# Patient Record
Sex: FEMALE | Race: WHITE | URBAN - METROPOLITAN AREA
[De-identification: names, ages, dates, MRNs, and addresses within clinical notes are randomized per-mention and may not be internally consistent; named-entity substitution may affect disease eponyms.]

---

## 2018-08-10 ENCOUNTER — IMPORTED ENCOUNTER (OUTPATIENT)
Dept: URBAN - METROPOLITAN AREA CLINIC 50 | Facility: CLINIC | Age: 73
End: 2018-08-10

## 2018-08-17 NOTE — PATIENT DISCUSSION
New Prescription: Restasis MultiDose (cyclosporine): drops: 0.05% 1 drop twice a day into both eyes 08-

## 2018-08-17 NOTE — PATIENT DISCUSSION
CATARACT, OU - VISUALLY SIGNIFICANT. SCHEDULE PHACO WITH IOL  IF VISUAL SYMPTOMS PERSIST. GLASSES RX GIVEN TO FILL IF DESIRES IN THE EVENT PATIENT DOES NOT PROCEED WITH SURGERY.

## 2018-08-17 NOTE — PATIENT DISCUSSION
K SICCA OU: PRESCRIBED PRESERVATIVE FREE ARTIFICIAL TEARS 4-6X A DAY, OU AND THE DAILY INTAKE OF OMEGA-3 DHA/EPA FATTY ACIDS TO HELP RELIEVE SYMPTOMS. ADD NIGHTLY LUBRICATING OINTMENT OR GEL. PRESCRIBED RESTASIS BID WITH LOTEMAX BRIDGE. CONSIDER PUNCTAL PLUGS AND/OR LIPIFLOW TREATMENT NEXT VISIT IF NOT RESPONSIVE OR IF SYMPTOMS PERSIST. RETURN FOR FOLLOW-UP AS SCHEDULED OR SOONER IF SYMPTOMS WORSEN.

## 2018-08-21 ENCOUNTER — IMPORTED ENCOUNTER (OUTPATIENT)
Dept: URBAN - METROPOLITAN AREA CLINIC 50 | Facility: CLINIC | Age: 73
End: 2018-08-21

## 2018-11-27 NOTE — PATIENT DISCUSSION
Dry Eye Syndrome Counseling: I have discussed the diagnosis and the pathophysiology of this disease with the patient. Eyelid pathology and systemic illnesses such as Sjogrens disease or rheumatoid arthritis may contribute to severity. Vision may be limited by dry eye, and symptoms exacerbated by environmental factors such as smoke, wind, or prolonged eye use. Lifestyle habits and environmental factors contributing to dry eyes have been reviewed with the patient. Daily prescribed and over the counter medications, along with their potential contributions to dry eye symptoms, have been discussed. Treatment options include, but are not limited to, artificial tears, punctal plugs, topical cyclosporine, oral omega-3 supplements, Lipiflow, moisture goggles, and lubricating ointments. I stressed the importance of compliance with treatment.

## 2018-11-27 NOTE — PATIENT DISCUSSION
K SICCA OU: CONTINUE PRESERVATIVE FREE ARTIFICIAL TEARS 4-6X A DAY, OU AND THE DAILY INTAKE OF OMEGA-3 DHA/EPA FATTY ACIDS TO HELP RELIEVE SYMPTOMS. ADD CELLUVISC OR  NIGHTLY LUBRICATING OINTMENT OR GEL.  CONTINUE RESTASIS BID

## 2019-01-04 ENCOUNTER — IMPORTED ENCOUNTER (OUTPATIENT)
Dept: URBAN - METROPOLITAN AREA CLINIC 50 | Facility: CLINIC | Age: 74
End: 2019-01-04

## 2019-01-11 ENCOUNTER — IMPORTED ENCOUNTER (OUTPATIENT)
Dept: URBAN - METROPOLITAN AREA CLINIC 50 | Facility: CLINIC | Age: 74
End: 2019-01-11

## 2019-02-07 ENCOUNTER — IMPORTED ENCOUNTER (OUTPATIENT)
Dept: URBAN - METROPOLITAN AREA CLINIC 50 | Facility: CLINIC | Age: 74
End: 2019-02-07

## 2019-02-15 ENCOUNTER — IMPORTED ENCOUNTER (OUTPATIENT)
Dept: URBAN - METROPOLITAN AREA CLINIC 50 | Facility: CLINIC | Age: 74
End: 2019-02-15

## 2019-02-15 NOTE — PATIENT DISCUSSION
"""S/P IOL OD: Symfony ZXR00 21.5 vs. 22.0 (ORA) (Target: [place]) +ORA/Femto/Arcs +TM/Omidria. Continue post operative instructions and drops per schedule.  """

## 2019-02-21 ENCOUNTER — IMPORTED ENCOUNTER (OUTPATIENT)
Dept: URBAN - METROPOLITAN AREA CLINIC 50 | Facility: CLINIC | Age: 74
End: 2019-02-21

## 2019-02-21 NOTE — PATIENT DISCUSSION
"""S/P IOL OS: Tecnis  ZLB00 23.0 (ORA) (Target: [place]) +ORA/Femto/Arcs +TM/Omidria. Continue post operative instructions and drops per schedule.  """

## 2019-03-01 ENCOUNTER — IMPORTED ENCOUNTER (OUTPATIENT)
Dept: URBAN - METROPOLITAN AREA CLINIC 50 | Facility: CLINIC | Age: 74
End: 2019-03-01

## 2019-03-01 NOTE — PATIENT DISCUSSION
"""S/P IOL OS: Tecnis  ZLB00 23.0 (ORA) (Target: [place]) +ORA/Femto/Arcs +TM/Omidria. Continue post operative instructions and drops per schedule.  """

## 2019-03-22 ENCOUNTER — IMPORTED ENCOUNTER (OUTPATIENT)
Dept: URBAN - METROPOLITAN AREA CLINIC 50 | Facility: CLINIC | Age: 74
End: 2019-03-22

## 2019-03-22 NOTE — PATIENT DISCUSSION
"""S/P IOL OU: OD: Symfony ZXR00 21.5 vs. 22.0 (ORA) (Target: [place]) +ORAFemto/Arcs +TMOmidria. OS: Tecnis MF ZLB00 23.0 (ORA) (Target: [place]) +ORA/Arcs +TM. "

## 2019-08-26 NOTE — PATIENT DISCUSSION
K SICCA OU: PRESCRIBED PRESERVATIVE FREE ARTIFICIAL TEARS 4-6X A DAY, OU AND THE DAILY INTAKE OF OMEGA-3 DHA/EPA FATTY ACIDS TO HELP RELIEVE SYMPTOMS. ADD NIGHTLY LUBRICATING OINTMENT OR GEL. CONTINUE  RESTASIS BID RETURN FOR FOLLOW-UP AS SCHEDULED OR SOONER IF SYMPTOMS WORSEN.

## 2019-09-27 ENCOUNTER — IMPORTED ENCOUNTER (OUTPATIENT)
Dept: URBAN - METROPOLITAN AREA CLINIC 50 | Facility: CLINIC | Age: 74
End: 2019-09-27

## 2019-09-27 PROBLEM — Z96.1: Noted: 2019-02-21

## 2019-09-27 PROBLEM — Z96.1: Noted: 2019-02-07

## 2019-09-27 PROBLEM — Z98.890: Noted: 2019-09-27

## 2019-10-07 ENCOUNTER — IMPORTED ENCOUNTER (OUTPATIENT)
Dept: URBAN - METROPOLITAN AREA CLINIC 50 | Facility: CLINIC | Age: 74
End: 2019-10-07

## 2019-11-19 ENCOUNTER — IMPORTED ENCOUNTER (OUTPATIENT)
Dept: URBAN - METROPOLITAN AREA CLINIC 50 | Facility: CLINIC | Age: 74
End: 2019-11-19

## 2019-11-25 ENCOUNTER — IMPORTED ENCOUNTER (OUTPATIENT)
Dept: URBAN - METROPOLITAN AREA CLINIC 50 | Facility: CLINIC | Age: 74
End: 2019-11-25

## 2019-11-25 NOTE — PATIENT DISCUSSION
"""Discussed with patient that to have best results would recommend brow lift with plastic surgeon. ""

## 2020-08-28 NOTE — PATIENT DISCUSSION
New Prescription: erythromycin (erythromycin): ointment: 5 mg/gram (0.5 %) a small amount every night as directed on eyelid 08-

## 2020-09-29 NOTE — PATIENT DISCUSSION
New Prescription: Restasis (cyclosporine): dropperette: 0.05% 1 drop twice a day as directed into both eyes 09-

## 2020-09-29 NOTE — PATIENT DISCUSSION
Continue: erythromycin (erythromycin): ointment: 5 mg/gram (0.5 %) a small amount every night as directed on eyelid 08-

## 2020-12-08 ENCOUNTER — IMPORTED ENCOUNTER (OUTPATIENT)
Dept: URBAN - METROPOLITAN AREA CLINIC 50 | Facility: CLINIC | Age: 75
End: 2020-12-08

## 2021-04-17 ASSESSMENT — TONOMETRY
OS_IOP_MMHG: 15
OS_IOP_MMHG: 16
OD_IOP_MMHG: 18
OS_IOP_MMHG: 15
OS_IOP_MMHG: 15
OD_IOP_MMHG: 15
OD_IOP_MMHG: 18
OD_IOP_MMHG: 15
OS_IOP_MMHG: 12
OD_IOP_MMHG: 16
OD_IOP_MMHG: 16
OS_IOP_MMHG: 15
OS_IOP_MMHG: 15
OD_IOP_MMHG: 18
OD_IOP_MMHG: 15
OD_IOP_MMHG: 16
OS_IOP_MMHG: 17
OS_IOP_MMHG: 17
OS_IOP_MMHG: 16
OD_IOP_MMHG: 14

## 2021-04-17 ASSESSMENT — VISUAL ACUITY
OS_BAT: 20/40
OD_PH: 20/30-
OD_SC: 20/20
OS_PH: 20/25
OS_SC: 20/25+2
OS_CC: J1+@ 16 IN
OD_OTHER: 20/50. 20/70.
OD_SC: 20/70
OD_CC: J1+@ 16 IN
OD_CC: J1+
OD_OTHER: 20/40. 20/80.
OD_BAT: 20/50
OS_CC: J1+@ 18 IN
OS_CC: J1+
OS_SC: 20/20
OD_CC: J1+@ 18 IN
OS_SC: 20/20
OS_PH: @ 16 IN
OS_CC: J4@ 14 IN
OD_CC: 20/50-
OS_SC: 20/20
OD_CC: J1+
OS_CC: J1+
OS_PH: @ 17 IN
OD_SC: 20/20
OS_SC: 20/20
OS_CC: 20/100
OS_SC: 20/20
OS_OTHER: 20/40. 20/40.
OD_BAT: 20/40
OS_OTHER: 20/40. 20/40.
OD_SC: 20/25-1
OD_PH: @ 17 IN
OD_CC: J4@ 14 IN
OS_CC: 20/25
OD_SC: 20/20-
OS_CC: 20/25-3
OS_BAT: 20/40
OD_SC: 20/20
OS_OTHER: 20/40. 20/80.
OS_BAT: 20/40
OD_CC: J1NEAR
OD_PH: @ 16 IN
OD_SC: 20/20-1
OS_BAT: 20/40
OS_OTHER: 20/40. 20/40.
OD_SC: 20/25-2
OD_BAT: 20/50
OS_SC: 20/50+1
OD_OTHER: 20/50. 20/70.
OS_CC: J1NEAR
OD_CC: 20/40-2

## 2021-11-30 ENCOUNTER — PREPPED CHART (OUTPATIENT)
Dept: URBAN - METROPOLITAN AREA CLINIC 53 | Facility: CLINIC | Age: 76
End: 2021-11-30

## 2021-12-08 ENCOUNTER — COMPREHENSIVE EXAM (OUTPATIENT)
Dept: URBAN - METROPOLITAN AREA CLINIC 53 | Facility: CLINIC | Age: 76
End: 2021-12-08

## 2021-12-08 DIAGNOSIS — H35.361: ICD-10-CM

## 2021-12-08 DIAGNOSIS — H43.813: ICD-10-CM

## 2021-12-08 DIAGNOSIS — H16.223: ICD-10-CM

## 2021-12-08 PROCEDURE — 92134 CPTRZ OPH DX IMG PST SGM RTA: CPT

## 2021-12-08 PROCEDURE — 92014 COMPRE OPH EXAM EST PT 1/>: CPT

## 2021-12-08 ASSESSMENT — VISUAL ACUITY
OD_SC: 20/20-2
OU_SC: J1+ @16"
OS_SC: 20/20-1

## 2021-12-08 ASSESSMENT — TONOMETRY
OS_IOP_MMHG: 16
OD_IOP_MMHG: 14

## 2022-08-24 NOTE — PATIENT DISCUSSION
Tweak to distance OS and small change to near.  Patient agreed that revised rx is sharper relative to 2019 final.  Discussed optional change.

## 2022-12-19 ENCOUNTER — COMPREHENSIVE EXAM (OUTPATIENT)
Dept: URBAN - METROPOLITAN AREA CLINIC 48 | Facility: LOCATION | Age: 77
End: 2022-12-19

## 2022-12-19 PROCEDURE — 92014 COMPRE OPH EXAM EST PT 1/>: CPT

## 2022-12-19 PROCEDURE — 92134 CPTRZ OPH DX IMG PST SGM RTA: CPT

## 2022-12-19 ASSESSMENT — VISUAL ACUITY
OD_SC: 20/25-1
OS_SC: 20/25
OU_SC: J1+@17"

## 2022-12-19 ASSESSMENT — KERATOMETRY
OS_AXISANGLE2_DEGREES: 035
OS_K1POWER_DIOPTERS: 43.75
OS_K2POWER_DIOPTERS: 44.25
OD_AXISANGLE_DEGREES: 025
OD_K1POWER_DIOPTERS: 43.50
OD_AXISANGLE2_DEGREES: 115
OS_AXISANGLE_DEGREES: 125
OD_K2POWER_DIOPTERS: 44.25

## 2022-12-19 ASSESSMENT — TONOMETRY
OS_IOP_MMHG: 14
OD_IOP_MMHG: 16

## 2023-12-20 ENCOUNTER — COMPREHENSIVE EXAM (OUTPATIENT)
Dept: URBAN - METROPOLITAN AREA CLINIC 48 | Facility: LOCATION | Age: 78
End: 2023-12-20

## 2023-12-20 DIAGNOSIS — H02.831: ICD-10-CM

## 2023-12-20 DIAGNOSIS — H43.813: ICD-10-CM

## 2023-12-20 DIAGNOSIS — H02.834: ICD-10-CM

## 2023-12-20 DIAGNOSIS — H35.363: ICD-10-CM

## 2023-12-20 DIAGNOSIS — H16.223: ICD-10-CM

## 2023-12-20 PROCEDURE — 92014 COMPRE OPH EXAM EST PT 1/>: CPT

## 2023-12-20 PROCEDURE — 92134 CPTRZ OPH DX IMG PST SGM RTA: CPT

## 2023-12-20 ASSESSMENT — KERATOMETRY
OD_AXISANGLE_DEGREES: 025
OS_AXISANGLE_DEGREES: 030
OD_K2POWER_DIOPTERS: 44.50
OS_AXISANGLE2_DEGREES: 120
OD_AXISANGLE2_DEGREES: 115
OS_K2POWER_DIOPTERS: 44.25
OS_K1POWER_DIOPTERS: 44.50
OD_K1POWER_DIOPTERS: 43.50

## 2023-12-20 ASSESSMENT — VISUAL ACUITY
OU_SC: J1+
OS_SC: 20/20
OD_SC: 20/20

## 2023-12-20 ASSESSMENT — TONOMETRY
OS_IOP_MMHG: 15
OD_IOP_MMHG: 16

## 2024-12-23 ENCOUNTER — COMPREHENSIVE EXAM (OUTPATIENT)
Age: 79
End: 2024-12-23

## 2024-12-23 DIAGNOSIS — H16.223: ICD-10-CM

## 2024-12-23 DIAGNOSIS — H35.363: ICD-10-CM

## 2024-12-23 DIAGNOSIS — H43.813: ICD-10-CM

## 2024-12-23 PROCEDURE — 99214 OFFICE O/P EST MOD 30 MIN: CPT

## 2024-12-23 PROCEDURE — 92134 CPTRZ OPH DX IMG PST SGM RTA: CPT

## 2025-05-02 NOTE — PATIENT DISCUSSION
Approaching visual significance. However, pt indicates not consistently interfering with activities of daily living.
No evidence of choroidal melanoma. Discussed condition with patient. Recommend UV protection and annual fundus exam. Instructed to call if new symptoms.
Patient advised to call if any problems, questions, or concerns.
Patient made aware of 24/7 emergency services.
Patient understands condition, prognosis and need for follow up care.
monocular precautions.
restasis BID.
wc, at's.
Yes